# Patient Record
Sex: FEMALE | Race: WHITE | NOT HISPANIC OR LATINO | Employment: OTHER | URBAN - METROPOLITAN AREA
[De-identification: names, ages, dates, MRNs, and addresses within clinical notes are randomized per-mention and may not be internally consistent; named-entity substitution may affect disease eponyms.]

---

## 2017-05-11 ENCOUNTER — OFFICE VISIT (OUTPATIENT)
Dept: FAMILY MEDICINE CLINIC | Facility: CLINIC | Age: 72
End: 2017-05-11

## 2017-05-11 VITALS
HEART RATE: 85 BPM | DIASTOLIC BLOOD PRESSURE: 80 MMHG | RESPIRATION RATE: 16 BRPM | WEIGHT: 110 LBS | SYSTOLIC BLOOD PRESSURE: 132 MMHG | BODY MASS INDEX: 21.6 KG/M2 | HEIGHT: 60 IN | TEMPERATURE: 98.8 F

## 2017-05-11 DIAGNOSIS — J43.8 OTHER EMPHYSEMA (HCC): Primary | ICD-10-CM

## 2017-05-11 PROCEDURE — 99213 OFFICE O/P EST LOW 20 MIN: CPT | Performed by: FAMILY MEDICINE

## 2017-07-05 ENCOUNTER — OFFICE VISIT (OUTPATIENT)
Dept: FAMILY MEDICINE CLINIC | Facility: CLINIC | Age: 72
End: 2017-07-05

## 2017-07-05 VITALS
DIASTOLIC BLOOD PRESSURE: 80 MMHG | SYSTOLIC BLOOD PRESSURE: 161 MMHG | HEIGHT: 60 IN | WEIGHT: 109 LBS | HEART RATE: 92 BPM | OXYGEN SATURATION: 88 % | RESPIRATION RATE: 16 BRPM | TEMPERATURE: 98.3 F | BODY MASS INDEX: 21.4 KG/M2

## 2017-07-05 DIAGNOSIS — J43.8 OTHER EMPHYSEMA (HCC): Primary | ICD-10-CM

## 2017-07-05 DIAGNOSIS — R09.02 HYPOXIA: ICD-10-CM

## 2017-07-05 PROCEDURE — 99214 OFFICE O/P EST MOD 30 MIN: CPT | Performed by: FAMILY MEDICINE

## 2017-07-05 NOTE — PROGRESS NOTES
"Chief Complaint   Patient presents with   • Consult       Subjective   This patient presents the office to reevaluate her COPD.  [place]  She is concerned concerned about the decreased oxygen content at high altitudes.    Her next appointment with pulmonary medicine is the end of August.  I have reviewed and updated her medications, medical history and problem list during today's office visit.        Social History   Substance Use Topics   • Smoking status: Former Smoker     Packs/day: 1.00   • Smokeless tobacco: Never Used   • Alcohol use No       Review of Systems   Respiratory: Positive for cough (occaisional) and shortness of breath.    Cardiovascular: Negative for chest pain.       Objective   /80  Pulse 92  Temp 98.3 °F (36.8 °C) (Oral)   Resp 16  Ht 59.5\" (151.1 cm)  Wt 109 lb (49.4 kg)  SpO2 (!) 88% Comment: after walking down the hallway, room air  BMI 21.65 kg/m2  Body mass index is 21.65 kg/(m^2).  Physical Exam   Constitutional: She is cooperative. No distress.   Eyes: Conjunctivae and lids are normal.   Neck: Carotid bruit is not present. No tracheal deviation present.   Cardiovascular: Normal rate, regular rhythm and normal heart sounds.    No murmur heard.  Pulmonary/Chest: Effort normal and breath sounds normal.   Neurological: She is alert. She is not disoriented.   Skin: Skin is warm and dry.   Psychiatric: She has a normal mood and affect. Her speech is normal and behavior is normal.   Vitals reviewed.      Data Reviewed:    Recent pft shows copd.    Assessment/Plan     Problem List Items Addressed This Visit        Respiratory    COPD (chronic obstructive pulmonary disease) - Primary    Hypoxia  Portable O2 concentrator ordered through [employer]. 2 L per nasal cannula. Overnight study ordered to see if she needs nocturnal use. She will use it on her upcoming airplane trip. Copy of the order given to the patient so she can show it to the " airline. Letter also given to patient.           Outpatient Encounter Prescriptions as of 7/5/2017   Medication Sig Dispense Refill   • aspirin 81 MG tablet Take 81 mg by mouth daily.     • calcium carbonate (OS-BRANDI) 600 MG tablet Take 600 mg by mouth daily.     • folic acid (FOLVITE) 1 MG tablet Take 1 mg by mouth daily.     • lisinopril (PRINIVIL,ZESTRIL) 5 MG tablet Take 5 mg by mouth daily.     • Magnesium 250 MG tablet Take by mouth.     • Omega-3 Fatty Acids (FISH OIL) 1200 MG capsule capsule Take 1,200 mg by mouth daily.     • Tiotropium Bromide-Olodaterol 2.5-2.5 MCG/ACT aerosol solution Inhale 2 puffs Every Night for 180 days. 1 inhaler 5     No facility-administered encounter medications on file as of 7/5/2017.        No orders of the defined types were placed in this encounter.      Continue with current treatment plan.         RTC as needed or sooner if symptoms worsen or change

## 2017-07-18 ENCOUNTER — TELEPHONE (OUTPATIENT)
Dept: FAMILY MEDICINE CLINIC | Facility: CLINIC | Age: 72
End: 2017-07-18

## 2017-08-30 ENCOUNTER — HOSPITAL ENCOUNTER (OUTPATIENT)
Dept: SLEEP MEDICINE | Facility: HOSPITAL | Age: 72
Discharge: HOME | End: 2017-08-30
Admitting: INTERNAL MEDICINE

## 2017-08-30 PROCEDURE — 99203 OFFICE O/P NEW LOW 30 MIN: CPT | Performed by: INTERNAL MEDICINE

## 2017-08-30 PROCEDURE — G0463 HOSPITAL OUTPT CLINIC VISIT: HCPCS

## 2017-10-06 ENCOUNTER — HOSPITAL ENCOUNTER (OUTPATIENT)
Dept: PET IMAGING | Facility: HOSPITAL | Age: 72
Discharge: HOME | End: 2017-10-06
Admitting: CLINICAL NURSE SPECIALIST

## 2017-10-06 ENCOUNTER — CLINICAL SUPPORT (OUTPATIENT)
Dept: OTHER | Facility: HOSPITAL | Age: 72
End: 2017-10-06

## 2017-10-06 VITALS
HEIGHT: 59 IN | SYSTOLIC BLOOD PRESSURE: 116 MMHG | BODY MASS INDEX: 22.18 KG/M2 | TEMPERATURE: 97.6 F | WEIGHT: 110 LBS | HEART RATE: 72 BPM | RESPIRATION RATE: 18 BRPM | DIASTOLIC BLOOD PRESSURE: 70 MMHG

## 2017-10-06 DIAGNOSIS — Z87.891 HISTORY OF SMOKING 30 OR MORE PACK YEARS: ICD-10-CM

## 2017-10-06 DIAGNOSIS — Z12.2 ENCOUNTER FOR SCREENING FOR LUNG CANCER: Primary | ICD-10-CM

## 2017-10-06 DIAGNOSIS — Z12.2 ENCOUNTER FOR SCREENING FOR LUNG CANCER: ICD-10-CM

## 2017-10-06 PROCEDURE — G0463 HOSPITAL OUTPT CLINIC VISIT: HCPCS | Performed by: CLINICAL NURSE SPECIALIST

## 2017-10-06 PROCEDURE — G0296 VISIT TO DETERM LDCT ELIG: HCPCS | Performed by: CLINICAL NURSE SPECIALIST

## 2017-10-06 PROCEDURE — G0297 LDCT FOR LUNG CA SCREEN: HCPCS

## 2017-10-06 RX ORDER — ALBUTEROL SULFATE 90 UG/1
2 AEROSOL, METERED RESPIRATORY (INHALATION) EVERY 4 HOURS PRN
COMMUNITY

## 2017-10-06 NOTE — PROGRESS NOTES
[facility] Low-Dose Lung Cancer CT Screening Visit    Bethanie Waters is a pleasant 72 y.o.  female seen today at the request of Dr. Kb Huang in our Multidisciplinary Clinic, being seen for a Shared Decision Making Visit prior to Low-Dose Lung Cancer Screening.    SMOKING HISTORY:   History   Smoking Status   • Former Smoker   • Packs/day: 2.25   • Years: 45.00   • Types: Cigarettes   • Quit date: 2007   Smokeless Tobacco   • Never Used     Comment: Began smoking age 17.  Quit about 10 years ago.  Smoked about 2.25 ppd for 45 years for a 101.25 pack year history.       MEDICATIONS:  I have reviewed the medication list with the patient and updated it in the electronic medical record. Medication dosages and frequencies were confirmed to be accurate with the patient.    Bethanie Waters is here today for a low-dose lung cancer screening CT evaluation.  She is currently undergoing an evaluation by pulmonary and has been referred for screening.  She has completed a home sleep apnea evaluation and is awaiting those results as well.  She does have access to oxygen at home and uses it PRN almost daily at 2L/min.  She states that her oxygen saturation typically runs at about 95%.  She also uses an Albuterol inhaler usually daily for increasing shortness of breath as well.  Today her oxygen saturation on room air ranged from 90 to 92% on room air.  Her last chest CT was in September, 2015 and we reviewed those results together today.  She has a left axillary lipoma that continues to be palpable and showed up on her CT scan in 2015.  She also had some changes consistent with emphysema and a 3 cm on the upper pole of her left kidney.  She reports having had a number of surgeries to remove kidney cysts in the past.  Year ago she was told that on an X-Ray, it appeared that she had been exposed to TB.  She quit smoking about 10 years ago but smoked heavily prior to that for a 101.25 pack year history.  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  She also reports have a very serious respiratory inflammation in the past after traveling next to a coughing passenger for which she was hospitalized.  It took her about a year to recover.  She has no basement and one of her sons lives with her.  Nobody smokes in her house currently.  We discussed the connection between Radon exposure and lung cancer.  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  She is not aware of any significant exposures to chemicals or asbestos.  She has no personal history of cancer and has no lung cancer in her family.  She is confident that she will never smoke in the future.    She has no hemoptysis, unintentional weight loss or increasing shortness of breath. The patient has no signs or symptoms of lung cancer.     Together we discussed the risks and benefits of lung cancer screening. We reviewed the patient's smoking history and counseled on the importance of continued tobacco/smoking abstinence.     /70 (BP Location: Right arm, Patient Position: Sitting)  Pulse 72  Temp 97.6 °F (36.4 °C) (Oral)   Resp 18  Ht 58.75\" (149.2 cm)  Wt 110 lb (49.9 kg)  BMI 22.41 kg/m2    Pain Score    10/06/17 1220   PainSc: 0-No pain       Chest: Lung sounds are clear to auscultation, no wheezes, rales or rhonchi, with symmetric air entry.  Her oxygen saturation on room air ranged from 90 - 92% upon arrival to the clinic.    Smoking Cessation / Abstinence DISCUSSION HELD TODAY:     We discussed a number of resources to assist with smoking cessation including the 1-800-Quit Now line, Health Department programs, [place] [facility], and online and smart phone tools.  She states that she has no need as she will never smoke again.    Recommendations for continued lung cancer screening:      We discussed the NCCN guidelines for lung cancer screening and the patient " verbalized understanding that annual screening is recommended until fifteen years beyond smoking. The Lung Cancer Screening Shared Decision-Making Tool was utilized as an aid in discussing whether or not screening was right. The patient has decided to proceed with a Low Dose Lung Cancer Screening CT today. The patient requests that the results of his screening be shared with his PCP as well.       The patient verbalizes understanding that results of this low dose lung CT exam will be called and that assistance will be provided in arranging any necessary follow-up.    After review of the NCCN guidelines and recommendations for ongoing screening, the patient verbalized understanding of recommendations for follow-up. We discussed the importance of continued annual screening until 15 years beyond smoking.     15 minutes out of 30 minutes face-to-face spent counseling patient extensively on the continued health benefits of being tobacco free, the importance of continued tobacco abstinence, community resources available, and national guidelines for continued annual lung cancer screening until 15 years beyond a smoking history.     Angela Griffiths, MSN, APRN, ACNS-BC, AOCN, CHPN  Clinical Nurse Specialist  [facility]

## 2017-10-31 ENCOUNTER — TRANSCRIBE ORDERS (OUTPATIENT)
Dept: CARDIAC REHAB | Facility: HOSPITAL | Age: 72
End: 2017-10-31

## 2017-10-31 DIAGNOSIS — J44.9 COPD, SEVERE (HCC): Primary | ICD-10-CM

## 2017-11-08 ENCOUNTER — APPOINTMENT (OUTPATIENT)
Dept: CARDIAC REHAB | Facility: HOSPITAL | Age: 72
End: 2017-11-08

## 2021-02-19 ENCOUNTER — IMMUNIZATION (OUTPATIENT)
Dept: VACCINE CLINIC | Facility: HOSPITAL | Age: 76
End: 2021-02-19

## 2021-02-19 PROCEDURE — 0001A: CPT | Performed by: INTERNAL MEDICINE

## 2021-02-19 PROCEDURE — 91300 HC SARSCOV02 VAC 30MCG/0.3ML IM: CPT | Performed by: INTERNAL MEDICINE

## 2021-03-12 ENCOUNTER — IMMUNIZATION (OUTPATIENT)
Dept: VACCINE CLINIC | Facility: HOSPITAL | Age: 76
End: 2021-03-12

## 2021-03-12 PROCEDURE — 91300 HC SARSCOV02 VAC 30MCG/0.3ML IM: CPT | Performed by: INTERNAL MEDICINE

## 2021-03-12 PROCEDURE — 0002A: CPT | Performed by: INTERNAL MEDICINE

## 2021-04-22 ENCOUNTER — TRANSCRIBE ORDERS (OUTPATIENT)
Dept: ADMINISTRATIVE | Facility: HOSPITAL | Age: 76
End: 2021-04-22

## 2021-04-22 DIAGNOSIS — Z12.2 ENCOUNTER FOR SCREENING FOR LUNG CANCER: Primary | ICD-10-CM

## 2021-05-19 ENCOUNTER — HOSPITAL ENCOUNTER (OUTPATIENT)
Dept: CT IMAGING | Facility: HOSPITAL | Age: 76
Discharge: HOME | End: 2021-05-19
Admitting: INTERNAL MEDICINE

## 2021-05-19 DIAGNOSIS — Z12.2 ENCOUNTER FOR SCREENING FOR LUNG CANCER: ICD-10-CM

## 2021-05-19 PROCEDURE — 71271 CT THORAX LUNG CANCER SCR C-: CPT

## 2021-08-05 ENCOUNTER — TRANSCRIBE ORDERS (OUTPATIENT)
Dept: ADMINISTRATIVE | Facility: HOSPITAL | Age: 76
End: 2021-08-05

## 2021-08-05 DIAGNOSIS — H53.47 HETERONYMOUS BILATERAL FIELD DEFECTS IN VISUAL FIELD: Primary | ICD-10-CM

## 2021-08-31 ENCOUNTER — APPOINTMENT (OUTPATIENT)
Dept: MRI IMAGING | Facility: HOSPITAL | Age: 76
End: 2021-08-31

## 2021-08-31 ENCOUNTER — HOSPITAL ENCOUNTER (OUTPATIENT)
Dept: MRI IMAGING | Facility: HOSPITAL | Age: 76
Discharge: HOME | End: 2021-08-31
Admitting: OPHTHALMOLOGY

## 2021-08-31 DIAGNOSIS — H53.47 HETERONYMOUS BILATERAL FIELD DEFECTS IN VISUAL FIELD: ICD-10-CM

## 2021-08-31 PROCEDURE — 82565 ASSAY OF CREATININE: CPT

## 2021-08-31 PROCEDURE — 70543 MRI ORBT/FAC/NCK W/O &W/DYE: CPT

## 2021-08-31 PROCEDURE — A9577 INJ MULTIHANCE: HCPCS | Performed by: OPHTHALMOLOGY

## 2021-08-31 PROCEDURE — 0 GADOBENATE DIMEGLUMINE 529 MG/ML SOLUTION: Performed by: OPHTHALMOLOGY

## 2021-08-31 PROCEDURE — 70553 MRI BRAIN STEM W/O & W/DYE: CPT

## 2021-08-31 RX ADMIN — GADOBENATE DIMEGLUMINE 10 ML: 529 INJECTION, SOLUTION INTRAVENOUS at 16:32

## 2021-09-02 LAB — CREAT BLDA-MCNC: 0.8 MG/DL (ref 0.6–1.3)

## 2021-11-03 ENCOUNTER — IMMUNIZATION (OUTPATIENT)
Dept: VACCINE CLINIC | Facility: HOSPITAL | Age: 76
End: 2021-11-03

## 2021-11-03 PROCEDURE — 91300 HC SARSCOV02 VAC 30MCG/0.3ML IM: CPT | Performed by: INTERNAL MEDICINE

## 2021-11-03 PROCEDURE — 0004A ADM SARSCOV2 30MCG/0.3ML BOOSTER: CPT | Performed by: INTERNAL MEDICINE

## 2022-08-11 ENCOUNTER — OFFICE VISIT (OUTPATIENT)
Dept: FAMILY MEDICINE CLINIC | Facility: CLINIC | Age: 77
End: 2022-08-11

## 2022-08-11 VITALS
HEIGHT: 60 IN | TEMPERATURE: 98.4 F | HEART RATE: 77 BPM | SYSTOLIC BLOOD PRESSURE: 138 MMHG | WEIGHT: 118 LBS | RESPIRATION RATE: 18 BRPM | OXYGEN SATURATION: 94 % | DIASTOLIC BLOOD PRESSURE: 66 MMHG | BODY MASS INDEX: 23.16 KG/M2

## 2022-08-11 DIAGNOSIS — I10 ESSENTIAL HYPERTENSION: ICD-10-CM

## 2022-08-11 DIAGNOSIS — M79.89 MASS OF SOFT TISSUE OF KNEE: Primary | ICD-10-CM

## 2022-08-11 DIAGNOSIS — M25.469 KNEE SWELLING: ICD-10-CM

## 2022-08-11 PROBLEM — I25.118 ATHEROSCLEROSIS OF NATIVE CORONARY ARTERY OF NATIVE HEART WITH STABLE ANGINA PECTORIS (HCC): Status: ACTIVE | Noted: 2018-11-12

## 2022-08-11 PROBLEM — Z78.9 STATIN INTOLERANCE: Status: ACTIVE | Noted: 2018-11-12

## 2022-08-11 PROCEDURE — 99204 OFFICE O/P NEW MOD 45 MIN: CPT | Performed by: FAMILY MEDICINE

## 2022-08-11 PROCEDURE — 73560 X-RAY EXAM OF KNEE 1 OR 2: CPT | Performed by: FAMILY MEDICINE

## 2022-08-11 RX ORDER — BUDESONIDE, GLYCOPYRROLATE, AND FORMOTEROL FUMARATE 160; 9; 4.8 UG/1; UG/1; UG/1
AEROSOL, METERED RESPIRATORY (INHALATION)
COMMUNITY
Start: 2022-08-02

## 2022-08-11 RX ORDER — LISINOPRIL 5 MG/1
5 TABLET ORAL DAILY
Qty: 90 TABLET | Refills: 2 | Status: SHIPPED | OUTPATIENT
Start: 2022-08-11 | End: 2023-05-08

## 2022-08-11 NOTE — PROGRESS NOTES
"Chief Complaint  Joint Swelling (Rt knee swelling x 2 months, no injury )    Subjective          Bethanie presents to [facility] to evaluate swelling on the right medial aspect of her knee over the past 3 months.  No injury.  Her swelling has stayed about the same.  It is painless and causes no side effects of any sort.  She is a little worried about what it might be.    She needs blood pressure medicine refill.  Blood pressure is well controlled          Objective   Vital Signs:   Vitals:    08/11/22 1615   BP: 138/66   Pulse: 77   Resp: 18   Temp: 98.4 °F (36.9 °C)   SpO2: 94%   Weight: 53.5 kg (118 lb)   Height: 152.4 cm (60\")        BMI is within normal parameters. No other follow-up for BMI required.        Physical Exam  Constitutional:       General: She is not in acute distress.  Musculoskeletal:      Comments: See photo below  Soft tissue consistency similar to fatty tumor.  Nontender to palpation.  Measurements seen below clinical photograph below.   Neurological:      Mental Status: She is alert.        3.5 inches craniocaudad measurement by 3 inch width      Result Review :     The following data was reviewed by: Pardeep Davidson MD on 08/11/2022:  Right knee x-ray negative.         Assessment and Plan    Diagnoses and all orders for this visit:    1. Mass of soft tissue of knee (Primary)  Assessment & Plan:  New problem.  Check soft tissue MRI of right knee.  Referral to orthopedist for further evaluation.    Orders:  -     MRI Knee Right Without Contrast; Future  -     Ambulatory Referral to Orthopedic Surgery    2. Knee swelling  -     XR Knee 1 or 2 View Right    3. Essential hypertension  -     lisinopril (PRINIVIL,ZESTRIL) 5 MG tablet; Take 1 tablet by mouth Daily for 270 days.  Dispense: 90 tablet; Refill: 2      Follow Up   Return in about 6 months (around 2/11/2023) for recheck/refill medication.  Patient was given instructions and counseling regarding her condition or " for health maintenance advice. Please see specific information pulled into the AVS if appropriate.

## 2022-08-11 NOTE — ASSESSMENT & PLAN NOTE
New problem.  Check soft tissue MRI of right knee.  Referral to orthopedist for further evaluation.

## 2022-09-02 ENCOUNTER — HOSPITAL ENCOUNTER (OUTPATIENT)
Dept: MRI IMAGING | Facility: HOSPITAL | Age: 77
Discharge: HOME | End: 2022-09-02
Admitting: FAMILY MEDICINE

## 2022-09-02 DIAGNOSIS — M79.89 MASS OF SOFT TISSUE OF KNEE: ICD-10-CM

## 2022-09-02 PROCEDURE — 73721 MRI JNT OF LWR EXTRE W/O DYE: CPT

## 2022-09-06 ENCOUNTER — OFFICE VISIT (OUTPATIENT)
Dept: ORTHOPEDIC SURGERY | Facility: CLINIC | Age: 77
End: 2022-09-06

## 2022-09-06 VITALS — BODY MASS INDEX: 23.16 KG/M2 | TEMPERATURE: 98 F | HEIGHT: 60 IN | WEIGHT: 118 LBS

## 2022-09-06 DIAGNOSIS — M79.89 PAIN AND SWELLING OF RIGHT LOWER LEG: ICD-10-CM

## 2022-09-06 DIAGNOSIS — M79.661 PAIN AND SWELLING OF RIGHT LOWER LEG: ICD-10-CM

## 2022-09-06 DIAGNOSIS — R52 PAIN: Primary | ICD-10-CM

## 2022-09-06 PROCEDURE — 99203 OFFICE O/P NEW LOW 30 MIN: CPT | Performed by: ORTHOPAEDIC SURGERY

## 2022-09-06 PROCEDURE — 73562 X-RAY EXAM OF KNEE 3: CPT | Performed by: ORTHOPAEDIC SURGERY

## 2022-09-06 NOTE — PROGRESS NOTES
New Knee      Patient: Bethanie Waters        YOB: 1945    Medical Record Number: [id number]        Chief Complaints: Right knee swelling      History of Present Illness: This is a 76-year-old female who noticed a little bit of swelling on inferior to the knee its inferior medial states it does not hurt no history injury change in activity she just noticed it recently.  She does come with an MRI which shows some degenerative changes but nothing in that particular area.  Again she states she has no pain at all past medical history as listed below reviewed by me        Allergies:   Allergies   Allergen Reactions   • Codeine Nausea Only   • Simvastatin Myalgia   • Bactrim [Sulfamethoxazole-Trimethoprim] Rash   • Sulfa Antibiotics Rash       Medications:   Home Medications:  Current Outpatient Medications on File Prior to Visit   Medication Sig   • albuterol (PROVENTIL HFA;VENTOLIN HFA) 108 (90 Base) MCG/ACT inhaler Inhale 2 puffs Every 4 (Four) Hours As Needed for Wheezing.   • aspirin 81 MG tablet Take 81 mg by mouth daily.   • Breztri Aerosphere 160-9-4.8 MCG/ACT aerosol inhaler    • calcium carbonate (OS-BRANDI) 600 MG tablet Take 600 mg by mouth daily.   • folic acid (FOLVITE) 1 MG tablet Take 1 mg by mouth daily.   • lisinopril (PRINIVIL,ZESTRIL) 5 MG tablet Take 1 tablet by mouth Daily for 270 days.   • Magnesium 250 MG tablet Take 250 mg by mouth Daily. Takes sporadically   • O2 (OXYGEN) Inhale 2 L/min Daily As Needed (For increasing shortness of air or wheezing).   • Omega-3 Fatty Acids (FISH OIL) 1200 MG capsule capsule Take 1,200 mg by mouth daily.     No current facility-administered medications on file prior to visit.     Current Medications:  Scheduled Meds:  Continuous Infusions:No current facility-administered medications for this visit.    PRN Meds:.    Past Medical History:   Diagnosis Date   • Allergic rhinitis    • CAD (coronary artery disease)    • COPD (chronic obstructive pulmonary  "disease) (HCC)    • Hemochromatosis carrier    • History of echocardiogram 12/08/2010    normal LV wall motion and systolic function, no significant valvular abnormalities, RSVP 36   • Hyperlipidemia    • Kidney cysts    • Lipoma of axilla 09/11/2015        Past Surgical History:   Procedure Laterality Date   • AUGMENTATION MAMMAPLASTY      Bilateral Saline Implants   • CARDIAC SURGERY  1985    open heart   • COLONOSCOPY      Dr. Jane   • CORONARY ARTERY BYPASS GRAFT  1986   • EYE SURGERY  2017   • HYSTERECTOMY      partial   • KIDNEY SURGERY      Has had about 8 surgeries in the past to remove cysts from kidneys   • LASIK          Social History     Occupational History   • Not on file   Tobacco Use   • Smoking status: Former Smoker     Packs/day: 2.25     Years: 45.00     Pack years: 101.25     Types: Cigarettes     Quit date: 2007     Years since quitting: 15.6   • Smokeless tobacco: Never Used   • Tobacco comment: Began smoking age 17.  Quit about 10 years ago.  Smoked about 2.25 ppd for 45 years for a 101.25 pack year history.   Substance and Sexual Activity   • Alcohol use: No   • Drug use: No   • Sexual activity: Defer      Social History     Social History Narrative   • Not on file        Family History   Problem Relation Age of Onset   • Heart disease Mother    • Hypertension Mother    • Lung disease Mother    • COPD Father    • Heart disease Father    • Heart disease Sister    • Hypertension Sister    • Lung disease Sister    • Kidney cancer Maternal Grandfather    • COPD Sister    • Hemochromatosis Son    • Hemochromatosis Son    • No Known Problems Son    • No Known Problems Son              Review of Systems:     Review of Systems      Physical Exam: 76 y.o. female  General Appearance:    Alert, cooperative, in no acute distress                   Vitals:    09/06/22 1233   Temp: 98 °F (36.7 °C)   Weight: 53.5 kg (118 lb)   Height: 152.4 cm (60\")      Patient is alert and read ×3 no acute distress " appears her above-listed at height weight and age.  Affect is normal respiratory rate is normal unlabored. Heart rate regular rate rhythm, sclera, dentition and hearing are normal for the purpose of this exam.        Ortho Exam exam of the right knee she has no effusion no redness full range of motion no joint line tenderness negative bounce home negative Us she does have a little bit of swelling right that inferior medial aspect of the knee of the proximal medial tibia does not feel like a mass it just feels like some excess adipose tissue or fluid calf is soft and nontender    Procedures             Radiology:   AP, Lateral and merchant views of the right knee  were ordered/reviewed to evauateknee pain.  I have no comparative films these are essentially normal she has some mildDegenerative changes medial and patellofemoral no acute pathology she also comes with an MRI which shows some mild degenerative changes no mass at the point of interest  Imaging Results (Most Recent)     Procedure Component Value Units Date/Time    XR Knee 3 View Right [[id number]] Resulted: 09/06/22 1222     Updated: 09/06/22 1222    Impression:      Ordering physician's impression is located in the Encounter Note dated 09/06/22. X-ray performed in the DR room.          Assessment/Plan:    Right knee swelling just inferior medial to the knee I do not think there is any mass I told her that sometimes people will get some excess fluid in that area we talked about a brace and I think she wants to do 1 at this time do something for compression I told her she can get 1 at [employer] etc. she decides to do that.  She does have some degenerative changes so should those start bothering her where she does have knee pain we could do steroid injection she will give me a call

## 2023-07-31 DIAGNOSIS — I10 ESSENTIAL HYPERTENSION: ICD-10-CM

## 2023-07-31 RX ORDER — LISINOPRIL 5 MG/1
TABLET ORAL
Qty: 14 TABLET | Refills: 0 | Status: SHIPPED | OUTPATIENT
Start: 2023-07-31

## 2023-08-23 DIAGNOSIS — I10 ESSENTIAL HYPERTENSION: ICD-10-CM

## 2023-08-29 RX ORDER — LISINOPRIL 5 MG/1
TABLET ORAL
Qty: 7 TABLET | Refills: 0 | Status: SHIPPED | OUTPATIENT
Start: 2023-08-29

## 2023-08-29 NOTE — TELEPHONE ENCOUNTER
Rx Refill Note  Requested Prescriptions     Pending Prescriptions Disp Refills    lisinopril (PRINIVIL,ZESTRIL) 5 MG tablet [Pharmacy Med Name: LISINOPRIL 5MG TABLETS] 90 tablet      Sig: TAKE 1 TABLET BY MOUTH DAILY      Last office visit with prescribing clinician: 8/11/2022   Next office visit with prescribing clinician: Visit date not found

## 2023-09-24 DIAGNOSIS — I10 ESSENTIAL HYPERTENSION: ICD-10-CM

## 2023-09-25 RX ORDER — LISINOPRIL 5 MG/1
TABLET ORAL
Qty: 7 TABLET | Refills: 0 | OUTPATIENT
Start: 2023-09-25

## 2023-09-25 NOTE — TELEPHONE ENCOUNTER
Rx Refill Note  Requested Prescriptions     Pending Prescriptions Disp Refills    lisinopril (PRINIVIL,ZESTRIL) 5 MG tablet [Pharmacy Med Name: LISINOPRIL 5MG TABLETS] 7 tablet 0     Sig: TAKE 1 TABLET BY MOUTH DAILY      Last office visit with prescribing clinician: 8/11/2022   Next office visit with prescribing clinician: Visit date not found

## 2023-10-18 ENCOUNTER — OFFICE VISIT (OUTPATIENT)
Dept: FAMILY MEDICINE CLINIC | Facility: CLINIC | Age: 78
End: 2023-10-18
Payer: MEDICARE

## 2023-10-18 VITALS
BODY MASS INDEX: 22.74 KG/M2 | TEMPERATURE: 98.4 F | RESPIRATION RATE: 16 BRPM | HEIGHT: 60 IN | OXYGEN SATURATION: 95 % | DIASTOLIC BLOOD PRESSURE: 58 MMHG | WEIGHT: 115.8 LBS | SYSTOLIC BLOOD PRESSURE: 132 MMHG | HEART RATE: 80 BPM

## 2023-10-18 DIAGNOSIS — E78.5 DYSLIPIDEMIA: ICD-10-CM

## 2023-10-18 DIAGNOSIS — J43.8 OTHER EMPHYSEMA: ICD-10-CM

## 2023-10-18 DIAGNOSIS — Z13.6 SCREENING FOR ISCHEMIC HEART DISEASE: ICD-10-CM

## 2023-10-18 DIAGNOSIS — Z11.59 NEED FOR HEPATITIS C SCREENING TEST: ICD-10-CM

## 2023-10-18 DIAGNOSIS — R09.02 HYPOXIA: ICD-10-CM

## 2023-10-18 DIAGNOSIS — I10 ESSENTIAL HYPERTENSION: Primary | ICD-10-CM

## 2023-10-18 DIAGNOSIS — Z23 IMMUNIZATION DUE: ICD-10-CM

## 2023-10-18 PROCEDURE — G0008 ADMIN INFLUENZA VIRUS VAC: HCPCS | Performed by: FAMILY MEDICINE

## 2023-10-18 PROCEDURE — 90662 IIV NO PRSV INCREASED AG IM: CPT | Performed by: FAMILY MEDICINE

## 2023-10-18 PROCEDURE — 99214 OFFICE O/P EST MOD 30 MIN: CPT | Performed by: FAMILY MEDICINE

## 2023-10-18 PROCEDURE — G0009 ADMIN PNEUMOCOCCAL VACCINE: HCPCS | Performed by: FAMILY MEDICINE

## 2023-10-18 PROCEDURE — 90677 PCV20 VACCINE IM: CPT | Performed by: FAMILY MEDICINE

## 2023-10-18 PROCEDURE — 3078F DIAST BP <80 MM HG: CPT | Performed by: FAMILY MEDICINE

## 2023-10-18 PROCEDURE — 3075F SYST BP GE 130 - 139MM HG: CPT | Performed by: FAMILY MEDICINE

## 2023-10-18 RX ORDER — LISINOPRIL 5 MG/1
5 TABLET ORAL DAILY
Qty: 90 TABLET | Refills: 3 | Status: SHIPPED | OUTPATIENT
Start: 2023-10-18 | End: 2024-10-17

## 2023-10-18 NOTE — ASSESSMENT & PLAN NOTE
Will check lipid panel with upcoming labs.  Patient at high risk due to known coronary artery disease.

## 2023-10-18 NOTE — PROGRESS NOTES
"Chief Complaint  Med Refill and Hypertension (And for flu shot )    Subjective        Hypertension       Bethanie presents to [facility] for lab review and to refill current medications. Overall she feels well. No medication side effects are reported.            Objective   Vital Signs:   Vitals:    10/18/23 1612 10/18/23 1626   BP: 144/64 132/58   BP Location: Left arm Right arm   Patient Position: Sitting Sitting   Cuff Size:  Adult   Pulse: 80    Resp: 16    Temp: 98.4 °F (36.9 °C)    TempSrc: Oral    SpO2: 95%    Weight: 52.5 kg (115 lb 12.8 oz)    Height: 152.4 cm (60\")              BMI is within normal parameters. No other follow-up for BMI required.        Physical Exam  Vitals reviewed.   Constitutional:       General: She is not in acute distress.  Eyes:      General: Lids are normal.      Conjunctiva/sclera: Conjunctivae normal.   Neck:      Vascular: No carotid bruit.      Trachea: No tracheal deviation.   Cardiovascular:      Rate and Rhythm: Normal rate and regular rhythm.      Heart sounds: Normal heart sounds. No murmur heard.  Pulmonary:      Effort: Pulmonary effort is normal.      Breath sounds: Normal breath sounds.   Skin:     General: Skin is warm and dry.   Neurological:      Mental Status: She is alert. She is not disoriented.   Psychiatric:         Speech: Speech normal.         Behavior: Behavior normal. Behavior is cooperative.          Result Review :                Assessment and Plan    Diagnoses and all orders for this visit:    1. Essential hypertension (Primary)  Assessment & Plan:  Blood pressure well controlled.  Continue lisinopril.    Orders:  -     lisinopril (PRINIVIL,ZESTRIL) 5 MG tablet; Take 1 tablet by mouth Daily.  Dispense: 90 tablet; Refill: 3  -     Comprehensive Metabolic Panel; Future  -     CBC & Differential; Future  -     TSH; Future    2. Need for hepatitis C screening test  -     Hepatitis C Antibody; Future    3. Screening for " ischemic heart disease  -     CK; Future  -     Lipid Panel With / Chol / HDL Ratio; Future    4. Immunization due  -     Pneumococcal Conjugate Vaccine 20-Valent (PCV20)  -     Fluzone High-Dose 65+yrs    5. Other emphysema  Assessment & Plan:  Condition is stable. The current medical regimen is effective;  continue present plan and medications.  Continue to follow along with specialist regarding COPD diagnosis.      6. Hypoxia  Assessment & Plan:  Patient uses oxygen only during third shift on as-needed basis.      7. Dyslipidemia  Assessment & Plan:  Will check lipid panel with upcoming labs.  Patient at high risk due to known coronary artery disease.          Follow Up   Return in about 6 months (around 4/18/2024) for Medicare Wellness & regular visit, [id number] min.  Patient was given instructions and counseling regarding her condition or for health maintenance advice. Please see specific information pulled into the AVS if appropriate.

## 2023-10-18 NOTE — ASSESSMENT & PLAN NOTE
Condition is stable. The current medical regimen is effective;  continue present plan and medications.  Continue to follow along with specialist regarding COPD diagnosis.

## 2023-10-18 NOTE — PATIENT INSTRUCTIONS
Check on insurance coverage and cost for adacel Tdap(tetanus plus whooping cough vaccine) and get the immunization at your local pharmacy. (Once every 10 Years)     Check on insurance coverage and cost for Shingrix (newest shingles vaccine) and get the immunization at your local pharmacy. It is more effective than the old Zostavax vaccine and is recommended even if you have had the Zostavax vaccine in the past. For more information, please look at the website below:  https://www.cdc.gov/vaccines/vpd/shingles/public/shingrix/index.html     Your annual Covid booster is now due.     RSV vaccine is recommended.

## 2023-10-30 ENCOUNTER — OFFICE VISIT (OUTPATIENT)
Dept: FAMILY MEDICINE CLINIC | Facility: CLINIC | Age: 78
End: 2023-10-30
Payer: MEDICARE

## 2023-10-30 VITALS
SYSTOLIC BLOOD PRESSURE: 124 MMHG | OXYGEN SATURATION: 94 % | RESPIRATION RATE: 16 BRPM | BODY MASS INDEX: 22.97 KG/M2 | DIASTOLIC BLOOD PRESSURE: 68 MMHG | HEART RATE: 67 BPM | HEIGHT: 60 IN | TEMPERATURE: 97.1 F | WEIGHT: 117 LBS

## 2023-10-30 DIAGNOSIS — I25.810 CORONARY ARTERY DISEASE INVOLVING CORONARY BYPASS GRAFT OF NATIVE HEART WITHOUT ANGINA PECTORIS: ICD-10-CM

## 2023-10-30 DIAGNOSIS — Z78.9 STATIN INTOLERANCE: ICD-10-CM

## 2023-10-30 DIAGNOSIS — E78.5 DYSLIPIDEMIA: Primary | ICD-10-CM

## 2023-10-30 PROCEDURE — 3078F DIAST BP <80 MM HG: CPT | Performed by: FAMILY MEDICINE

## 2023-10-30 PROCEDURE — 3074F SYST BP LT 130 MM HG: CPT | Performed by: FAMILY MEDICINE

## 2023-10-30 PROCEDURE — 99214 OFFICE O/P EST MOD 30 MIN: CPT | Performed by: FAMILY MEDICINE

## 2023-10-30 RX ORDER — EVOLOCUMAB 140 MG/ML
140 INJECTION, SOLUTION SUBCUTANEOUS
Qty: 2 ML | Refills: 11 | Status: SHIPPED | OUTPATIENT
Start: 2023-10-30 | End: 2024-10-29

## 2023-10-30 NOTE — ASSESSMENT & PLAN NOTE
Cholesterol is not to goal.  Patient is multi statin intolerant.  She has known coronary artery disease.  Alternative therapy for this condition is needed.  Will start with Repatha. Samples given and demonstrated to patient. Follow up labs in 3 months

## 2023-10-30 NOTE — PROGRESS NOTES
"Chief Complaint  Follow-up and Hypertension (Test discuss )    Subjective        HPI   Bethanie presents to [facility] for     Lab review.  She has high cholesterol and known coronary artery disease and statin intolerance to simvastatin, rosuvastatin and a atorvastatin.  Her goal LDL cholesterol is less than 70.  Current labs are well above that number.  She has known CABG in the past and has had balloon angioplasty of left main artery in the past.  Thankfully no angina type pains currently.        Objective   Vital Signs:   Vitals:    10/30/23 1403   BP: 124/68   BP Location: Left arm   Patient Position: Sitting   Pulse: 67   Resp: 16   Temp: 97.1 °F (36.2 °C)   TempSrc: Oral   SpO2: 94%   Weight: 53.1 kg (117 lb)   Height: 152.4 cm (60\")            10/30/2023     2:06 PM   PHQ-2/PHQ-9 Depression Screening   Little Interest or Pleasure in Doing Things 0-->not at all   Feeling Down, Depressed or Hopeless 0-->not at all   PHQ-9: Brief Depression Severity Measure Score 0       BMI is within normal parameters. No other follow-up for BMI required.        Physical Exam  Constitutional:       General: She is not in acute distress.  Neurological:      Mental Status: She is alert.          Result Review :     The following data was reviewed by: Pardeep Davidson MD on 10/30/2023:  Hepatitis C Antibody (10/20/2023 11:35)  TSH (10/20/2023 11:35)  Lipid Panel With / Chol / HDL Ratio (10/20/2023 11:35) LDL cholesterol 164  CK (10/20/2023 11:35)  CBC & Differential (10/20/2023 11:35)  Comprehensive Metabolic Panel (10/20/2023 11:35)         Assessment and Plan    Diagnoses and all orders for this visit:    1. Dyslipidemia (Primary)  Assessment & Plan:  Cholesterol is not to goal.  Patient is multi statin intolerant.  She has known coronary artery disease.  Alternative therapy for this condition is needed.  Will start with Repatha. Samples given and demonstrated to patient. Follow up labs in 3 " months    Orders:  -     Repatha solution prefilled syringe injection; Inject 1 mL under the skin into the appropriate area as directed Every 14 (Fourteen) Days.  Dispense: 2 mL; Refill: 11  -     Comprehensive Metabolic Panel; Future  -     CBC & Differential; Future  -     CK; Future  -     Lipid Panel With / Chol / HDL Ratio; Future    2. Coronary artery disease involving coronary bypass graft of native heart without angina pectoris  -     Repatha solution prefilled syringe injection; Inject 1 mL under the skin into the appropriate area as directed Every 14 (Fourteen) Days.  Dispense: 2 mL; Refill: 11  -     Lipid Panel With / Chol / HDL Ratio; Future    3. Statin intolerance  -     Repatha solution prefilled syringe injection; Inject 1 mL under the skin into the appropriate area as directed Every 14 (Fourteen) Days.  Dispense: 2 mL; Refill: 11        Follow Up   Return in about 3 months (around 1/30/2024) for recheck/refill medication.  Patient was given instructions and counseling regarding her condition or for health maintenance advice. Please see specific information pulled into the AVS if appropriate.

## 2023-11-01 ENCOUNTER — TELEPHONE (OUTPATIENT)
Dept: FAMILY MEDICINE CLINIC | Facility: CLINIC | Age: 78
End: 2023-11-01
Payer: MEDICARE

## 2023-11-01 NOTE — TELEPHONE ENCOUNTER
"\"[facility]\" [facility] [place] needs a call concerning     Repatha solution prefilled syringe injection (10/30/2023)   "

## 2023-11-07 NOTE — TELEPHONE ENCOUNTER
Caller: AUGUSTUS HUFFMAN/ KAREN    Best call back number: [phone]*    What was the call regarding: AUGUSTUS CALLING WITH LEIGH, REGARDING PRIOR AUTHORIZATION FOR Repatha solution prefilled syringe injection . AUGUSTUS STATES THAT HE FAXED A PRIOR AUTHORIZATION FORM TO THE PRACTICE YESTERDAY, 11/6/23, TO START A PA. AUGUSTUS REQUEST A CALL BACK TO CONFIRM RECEIPT.

## 2023-11-13 ENCOUNTER — TELEPHONE (OUTPATIENT)
Dept: FAMILY MEDICINE CLINIC | Facility: CLINIC | Age: 78
End: 2023-11-13

## 2023-11-13 NOTE — TELEPHONE ENCOUNTER
Caller: [id number] [place], [place] - [address] AT [facility] [facility] [phone] [facility] [phone]     Relationship to patient: Pharmacy [phone]         Patient is needing: FAXED OVER REQUEST FOR PRIOR AUTHORIZATION FOR REPATHA, CALLING TO MAKE SURE RECEIVED AND IF THERE ARE ANY QUESTIONS.

## 2023-11-17 ENCOUNTER — TELEPHONE (OUTPATIENT)
Dept: FAMILY MEDICINE CLINIC | Facility: CLINIC | Age: 78
End: 2023-11-17

## 2023-11-20 ENCOUNTER — TELEPHONE (OUTPATIENT)
Dept: FAMILY MEDICINE CLINIC | Facility: CLINIC | Age: 78
End: 2023-11-20
Payer: MEDICARE

## 2023-11-20 NOTE — TELEPHONE ENCOUNTER
[facility] needs a prior authorization initiated.  Repatha solution prefilled syringe injection (10/30/2023)   Use #[id number]

## 2023-11-21 ENCOUNTER — PRIOR AUTHORIZATION (OUTPATIENT)
Dept: FAMILY MEDICINE CLINIC | Facility: CLINIC | Age: 78
End: 2023-11-21
Payer: MEDICARE

## 2024-02-02 ENCOUNTER — TELEPHONE (OUTPATIENT)
Dept: FAMILY MEDICINE CLINIC | Facility: CLINIC | Age: 79
End: 2024-02-02
Payer: MEDICARE

## 2024-02-06 ENCOUNTER — TELEPHONE (OUTPATIENT)
Dept: FAMILY MEDICINE CLINIC | Facility: CLINIC | Age: 79
End: 2024-02-06
Payer: MEDICARE

## 2024-10-17 ENCOUNTER — OFFICE VISIT (OUTPATIENT)
Dept: FAMILY MEDICINE CLINIC | Facility: CLINIC | Age: 79
End: 2024-10-17
Payer: MEDICARE

## 2024-10-17 VITALS
DIASTOLIC BLOOD PRESSURE: 68 MMHG | HEART RATE: 80 BPM | OXYGEN SATURATION: 99 % | HEIGHT: 60 IN | SYSTOLIC BLOOD PRESSURE: 132 MMHG | WEIGHT: 113 LBS | BODY MASS INDEX: 22.19 KG/M2

## 2024-10-17 DIAGNOSIS — Z20.818 EXPOSURE TO PERTUSSIS: Primary | ICD-10-CM

## 2024-10-17 PROCEDURE — 1126F AMNT PAIN NOTED NONE PRSNT: CPT | Performed by: FAMILY MEDICINE

## 2024-10-17 PROCEDURE — 3078F DIAST BP <80 MM HG: CPT | Performed by: FAMILY MEDICINE

## 2024-10-17 PROCEDURE — G2211 COMPLEX E/M VISIT ADD ON: HCPCS | Performed by: FAMILY MEDICINE

## 2024-10-17 PROCEDURE — 99213 OFFICE O/P EST LOW 20 MIN: CPT | Performed by: FAMILY MEDICINE

## 2024-10-17 PROCEDURE — 3075F SYST BP GE 130 - 139MM HG: CPT | Performed by: FAMILY MEDICINE

## 2024-10-17 RX ORDER — AZITHROMYCIN 250 MG/1
TABLET, FILM COATED ORAL
Qty: 6 TABLET | Refills: 0 | Status: SHIPPED | OUTPATIENT
Start: 2024-10-17

## 2024-10-17 NOTE — PROGRESS NOTES
"Chief Complaint  Exposed to pertussis    Subjective        HPI      The patient presents for evaluation of cough.    She reports that her boyfriend has been unwell with a severe cough for the past 3 days, which she describes as sounding like whooping cough. His condition worsened the following day, leading to a hospital visit where he was diagnosed with pertussis. She accompanied him to the emergency room and spent approximately 10 hours there. She is currently asymptomatic.    She also mentions that she has not received her COVID-19 or influenza vaccines this year, as she typically waits until mid-October to get them.    She has COPD and is in the third stage.    IMMUNIZATIONS  She has had shingles and pneumonia vaccines.           Vital Signs:   Vitals:    10/17/24 1510   BP: 132/68   Pulse: 80   SpO2: 99%   Weight: 51.3 kg (113 lb)   Height: 152.4 cm (60\")            10/17/2024     3:15 PM   PHQ-2/PHQ-9 Depression Screening   Little interest or pleasure in doing things Not at all   Feeling down, depressed, or hopeless Not at all       BMI is within normal parameters. No other follow-up for BMI required.        Physical Exam  Vitals reviewed.   Constitutional:       General: She is not in acute distress.  Cardiovascular:      Rate and Rhythm: Normal rate and regular rhythm.      Heart sounds: Normal heart sounds.   Pulmonary:      Effort: Pulmonary effort is normal.      Breath sounds: Normal breath sounds.   Neurological:      General: No focal deficit present.      Mental Status: She is alert.   Psychiatric:         Attention and Perception: Attention normal.         Mood and Affect: Mood normal.         Behavior: Behavior normal.                       Results                  Assessment and Plan     Assessment & Plan  Exposure to pertussis  Advised patient to go to the pharmacy today for Adacel Tdap vaccination.  We will treat her with 5 days of azithromycin.  She will take 2 tablets today on an empty stomach " with 8 ounces of water followed by 1 tablet daily with 8 ounces of water or nifty stomach for an additional 4 days.  Follow-up on an as-needed basis.  She knows to contact us if she gets any symptoms of cough or illness over the next couple of weeks.  Orders:    azithromycin (ZITHROMAX) 250 MG tablet; Take 2 tablets po the first day, then 1 tablet po daily for 4 days.        Return in about 2 months (around 12/17/2024), or if symptoms worsen or fail to improve, for Medicare Wellness & regular visit, [id number] min.     Patient was given instructions and counseling regarding her condition or for health maintenance advice. Please see specific information pulled into the AVS if appropriate.     Patient or patient representative verbalized consent for the use of Ambient Listening during the visit with  Pardeep Davidson MD for chart documentation. 10/17/2024  15:31 EDT

## 2024-11-06 DIAGNOSIS — I10 ESSENTIAL HYPERTENSION: ICD-10-CM

## 2024-11-06 RX ORDER — LISINOPRIL 5 MG/1
5 TABLET ORAL DAILY
Qty: 90 TABLET | Refills: 0 | Status: SHIPPED | OUTPATIENT
Start: 2024-11-06 | End: 2025-11-06

## 2025-02-01 DIAGNOSIS — I10 ESSENTIAL HYPERTENSION: ICD-10-CM

## 2025-02-03 DIAGNOSIS — I10 ESSENTIAL HYPERTENSION: ICD-10-CM

## 2025-02-03 RX ORDER — LISINOPRIL 5 MG/1
5 TABLET ORAL DAILY
Qty: 30 TABLET | Refills: 0 | Status: SHIPPED | OUTPATIENT
Start: 2025-02-03 | End: 2026-02-03

## 2025-02-03 RX ORDER — LISINOPRIL 5 MG/1
5 TABLET ORAL DAILY
Qty: 90 TABLET | OUTPATIENT
Start: 2025-02-03 | End: 2026-02-03

## 2025-04-25 ENCOUNTER — TELEPHONE (OUTPATIENT)
Dept: FAMILY MEDICINE CLINIC | Facility: CLINIC | Age: 80
End: 2025-04-25
Payer: MEDICARE

## 2025-04-25 NOTE — TELEPHONE ENCOUNTER
Received all from patient requesting a same day for cough and congestion.  I told patient that we did not have a same day with having only 3 providers.  I did suggest the urgent care and she stated that she would probably go.

## 2025-06-03 ENCOUNTER — OFFICE VISIT (OUTPATIENT)
Dept: FAMILY MEDICINE CLINIC | Facility: CLINIC | Age: 80
End: 2025-06-03
Payer: MEDICARE

## 2025-06-03 VITALS
DIASTOLIC BLOOD PRESSURE: 74 MMHG | HEART RATE: 74 BPM | WEIGHT: 106 LBS | BODY MASS INDEX: 20.81 KG/M2 | HEIGHT: 60 IN | SYSTOLIC BLOOD PRESSURE: 138 MMHG | OXYGEN SATURATION: 96 %

## 2025-06-03 DIAGNOSIS — L03.012 CELLULITIS OF FINGER OF LEFT HAND: ICD-10-CM

## 2025-06-03 DIAGNOSIS — I10 ESSENTIAL HYPERTENSION: Primary | ICD-10-CM

## 2025-06-03 DIAGNOSIS — E78.5 DYSLIPIDEMIA: ICD-10-CM

## 2025-06-03 DIAGNOSIS — Z78.9 STATIN INTOLERANCE: ICD-10-CM

## 2025-06-03 PROCEDURE — 1126F AMNT PAIN NOTED NONE PRSNT: CPT | Performed by: NURSE PRACTITIONER

## 2025-06-03 PROCEDURE — 3078F DIAST BP <80 MM HG: CPT | Performed by: NURSE PRACTITIONER

## 2025-06-03 PROCEDURE — 3075F SYST BP GE 130 - 139MM HG: CPT | Performed by: NURSE PRACTITIONER

## 2025-06-03 PROCEDURE — G2211 COMPLEX E/M VISIT ADD ON: HCPCS | Performed by: NURSE PRACTITIONER

## 2025-06-03 PROCEDURE — 99214 OFFICE O/P EST MOD 30 MIN: CPT | Performed by: NURSE PRACTITIONER

## 2025-06-03 RX ORDER — LISINOPRIL 5 MG/1
5 TABLET ORAL DAILY
Qty: 90 TABLET | Refills: 3 | Status: SHIPPED | OUTPATIENT
Start: 2025-06-03 | End: 2026-06-03

## 2025-06-03 RX ORDER — CEPHALEXIN 500 MG/1
500 CAPSULE ORAL 3 TIMES DAILY
Qty: 21 CAPSULE | Refills: 0 | Status: SHIPPED | OUTPATIENT
Start: 2025-06-03 | End: 2025-06-10

## 2025-06-03 RX ORDER — ARFORMOTEROL TARTRATE 15 UG/2ML
15 SOLUTION RESPIRATORY (INHALATION)
COMMUNITY

## 2025-06-03 RX ORDER — ALBUTEROL SULFATE 5 MG/ML
2.5 SOLUTION RESPIRATORY (INHALATION) EVERY 6 HOURS PRN
COMMUNITY

## 2025-06-03 NOTE — ASSESSMENT & PLAN NOTE
Recommend recheck lipid panel patient declined today  Continue to work on lower cholesterol diet and exercise as tolerated

## 2025-06-03 NOTE — PROGRESS NOTES
"Chief Complaint  Med Refill, Hypertension, and left middle finger cellulitis    Subjective        Hypertension  Associated symptoms: no chest pain, no headaches, no palpitations, no shortness of breath and no dizziness       Bethanie presents to [facility] as a 79-year-old female for follow-up on chronic conditions, to refill medications, review/order labs and to discuss some swelling and redness to her right middle finger    Hypertension-has been well-controlled on lisinopril.  No medication side effects.  No increase or changes in headaches no blurred vision no dizziness no flushing no chest pain or pressure    Hyperlipidemia-statin intolerance.  She does decline labs today  States that she does continue to work on a lower cholesterol diet and is try to remain as active as possible    She does have some redness and swelling to her left middle finger around her nailbed  Has been trying to keep the area dry and clean  No fever or drainage    She declines labs today  She has no other acute complaints        The following portions of the patient's history were reviewed and updated as appropriate: allergies, current medications, past family history, past medical history, past social history, past surgical history, and problem list     Review of Systems   Constitutional:  Negative for chills, fatigue and fever.   Eyes:  Negative for visual disturbance.   Respiratory:  Negative for cough, shortness of breath, wheezing and stridor.    Cardiovascular:  Negative for chest pain, palpitations and leg swelling.   Skin:  Positive for wound.   Neurological:  Negative for dizziness.   Psychiatric/Behavioral:  Negative for self-injury, sleep disturbance and suicidal ideas. The patient is not nervous/anxious.         Objective   Vital Signs:   Vitals:    06/03/25 1108   BP: 138/74   Pulse: 74   SpO2: 96%   Weight: 48.1 kg (106 lb)   Height: 152.4 cm (60\")            6/3/2025    11:17 AM   PHQ-2/PHQ-9 " Depression Screening   Little interest or pleasure in doing things Not at all   Feeling down, depressed, or hopeless Not at all       BMI is within normal parameters. No other follow-up for BMI required.        Physical Exam  Vitals reviewed.   Constitutional:       General: She is not in acute distress.  Eyes:      Conjunctiva/sclera: Conjunctivae normal.   Neck:      Thyroid: No thyromegaly.      Vascular: No carotid bruit.   Cardiovascular:      Rate and Rhythm: Normal rate and regular rhythm.      Heart sounds: Normal heart sounds.   Pulmonary:      Effort: Pulmonary effort is normal.      Breath sounds: Normal breath sounds.   Skin:     Findings: Wound present.      Comments: Erythema and swelling to left middle finger-  small red bump at nail bed   Neurological:      Mental Status: She is alert.   Psychiatric:         Attention and Perception: Attention normal.         Mood and Affect: Mood normal.          Result Review :                Assessment and Plan       Essential hypertension  Hypertension is stable and controlled  Continue current treatment regimen.  Blood pressure will be reassessed in 6 months.    Orders:    lisinopril (PRINIVIL,ZESTRIL) 5 MG tablet; Take 1 tablet by mouth Daily.    Cellulitis of finger of left hand  Take medication as directed  Follow-up with any increased redness swelling drainage fever       Dyslipidemia  Recommend recheck lipid panel patient declined today  Continue to work on lower cholesterol diet and exercise as tolerated         Statin intolerance  Intolerance to statin.  Declines labs today             Follow Up   Return in about 6 months (around 12/3/2025), or if symptoms worsen or fail to improve, for Medicare Wellness, Next scheduled follow up.  Patient was given instructions and counseling regarding her condition or for health maintenance advice. Please see specific information pulled into the AVS if appropriate.

## 2025-07-21 ENCOUNTER — OFFICE VISIT (OUTPATIENT)
Dept: FAMILY MEDICINE CLINIC | Facility: CLINIC | Age: 80
End: 2025-07-21
Payer: MEDICARE

## 2025-07-21 VITALS
HEART RATE: 88 BPM | BODY MASS INDEX: 21.2 KG/M2 | WEIGHT: 108 LBS | TEMPERATURE: 98.4 F | SYSTOLIC BLOOD PRESSURE: 136 MMHG | DIASTOLIC BLOOD PRESSURE: 60 MMHG | OXYGEN SATURATION: 96 % | HEIGHT: 60 IN

## 2025-07-21 DIAGNOSIS — J02.9 SORE THROAT: ICD-10-CM

## 2025-07-21 DIAGNOSIS — J40 BRONCHITIS: Primary | ICD-10-CM

## 2025-07-21 DIAGNOSIS — R50.9 FEVER, UNSPECIFIED FEVER CAUSE: ICD-10-CM

## 2025-07-21 LAB
EXPIRATION DATE: NORMAL
EXPIRATION DATE: NORMAL
FLUAV AG UPPER RESP QL IA.RAPID: NOT DETECTED
FLUBV AG UPPER RESP QL IA.RAPID: NOT DETECTED
INTERNAL CONTROL: NORMAL
INTERNAL CONTROL: NORMAL
Lab: NORMAL
Lab: NORMAL
S PYO AG THROAT QL: NEGATIVE
SARS-COV-2 AG UPPER RESP QL IA.RAPID: NOT DETECTED

## 2025-07-21 PROCEDURE — 3078F DIAST BP <80 MM HG: CPT | Performed by: FAMILY MEDICINE

## 2025-07-21 PROCEDURE — 87880 STREP A ASSAY W/OPTIC: CPT | Performed by: FAMILY MEDICINE

## 2025-07-21 PROCEDURE — 3075F SYST BP GE 130 - 139MM HG: CPT | Performed by: FAMILY MEDICINE

## 2025-07-21 PROCEDURE — 1126F AMNT PAIN NOTED NONE PRSNT: CPT | Performed by: FAMILY MEDICINE

## 2025-07-21 PROCEDURE — 87428 SARSCOV & INF VIR A&B AG IA: CPT | Performed by: FAMILY MEDICINE

## 2025-07-21 PROCEDURE — 99213 OFFICE O/P EST LOW 20 MIN: CPT | Performed by: FAMILY MEDICINE

## 2025-07-21 RX ORDER — DOXYCYCLINE HYCLATE 100 MG
100 TABLET ORAL 2 TIMES DAILY
Qty: 20 TABLET | Refills: 0 | Status: SHIPPED | OUTPATIENT
Start: 2025-07-21 | End: 2025-07-31

## 2025-07-21 NOTE — PROGRESS NOTES
"Chief Complaint  Fever (Started feeling bad yesterday), Sore Throat, and Cough    Subjective        HPI      The patient is a 79-year-old female presenting with a sore throat. She experienced severe coughing last night accompanied by dyspnea due to COPD and a fever of 100°F, which has now resolved. She reports producing dark mucus and suspects she may have strep throat. She describes a raw sensation when swallowing and attributes her symptoms to the weather and her medication regimen. Since the onset of her symptoms, she has noticed increased dyspnea and an abnormal feeling in her chest. She does not have any sinus tenderness or tooth pain.         MEDICATIONS  Current: albuterol, Otevra, Ormodrol  Past: Breztri           Vital Signs:   Vitals:    07/21/25 1329   BP: 136/60   Pulse: 88   Temp: 98.4 °F (36.9 °C)   SpO2: 96%   Weight: 49 kg (108 lb)   Height: 152.4 cm (60\")            6/3/2025    11:17 AM   PHQ-2/PHQ-9 Depression Screening   Little interest or pleasure in doing things Not at all   Feeling down, depressed, or hopeless Not at all       BMI is within normal parameters. No other follow-up for BMI required.        Physical Exam     General Appearance: Normal appearance, No acute distress.  HEENT: drainage down back of throat.  Respiratory: Lungs auscultated.clear, O2 in place  Psychiatric: normal affect, pleasant, cooperative with exam.  Other observations: no carotid bruits auscultated bilaterally.       The following data was reviewed by: Pardeep Davidson MD on 07/21/2025:  POCT SARS-CoV-2 Antigen DRISS + Flu (07/21/2025 13:43)  POCT rapid strep A (07/21/2025 13:44)    Results  - Strep test: negative  - COVID-19 test: negative  - Influenza test: negative                Assessment and Plan      1. Respiratory infection.  Initiate treatment due to COPD history. Prescribed doxycycline 100 mg BID for 10 days with food. Avoid OTC vitamins, minerals, and dairy products. Prescription sent to [facility].       "   Bronchitis  Treat expectantly with doxycycline twice daily x 10 days with food but avoidance of over-the-counter vitamins and minerals and also avoidance of dairy products while on this medication.  Orders:    POCT SARS-CoV-2 Antigen DRISS + Flu    doxycycline (VIBRAMYICN) 100 MG tablet; Take 1 tablet by mouth 2 (Two) Times a Day for 10 days. No dairy products within 2 hours of a dose    Sore throat    Orders:    POCT SARS-CoV-2 Antigen DRISS + Flu    POCT rapid strep A    Fever, unspecified fever cause    Orders:    POCT SARS-CoV-2 Antigen DRISS + Flu    POCT rapid strep A       Return if symptoms worsen or fail to improve.     Patient was given instructions and counseling regarding her condition or for health maintenance advice. Please see specific information pulled into the AVS if appropriate.     Patient or patient representative verbalized consent for the use of Ambient Listening during the visit with  Pardeep Davidson MD for chart documentation. 7/21/2025  14:00 EDT

## 2025-08-13 ENCOUNTER — TRANSCRIBE ORDERS (OUTPATIENT)
Dept: CARDIAC REHAB | Facility: HOSPITAL | Age: 80
End: 2025-08-13
Payer: MEDICARE

## 2025-08-13 DIAGNOSIS — J44.9 COPD, SEVERE: Primary | ICD-10-CM

## 2025-08-27 ENCOUNTER — OFFICE VISIT (OUTPATIENT)
Dept: CARDIAC REHAB | Facility: HOSPITAL | Age: 80
End: 2025-08-27
Payer: MEDICARE

## 2025-08-27 VITALS
SYSTOLIC BLOOD PRESSURE: 140 MMHG | BODY MASS INDEX: 20.97 KG/M2 | WEIGHT: 106.8 LBS | HEART RATE: 80 BPM | HEIGHT: 60 IN | OXYGEN SATURATION: 97 % | DIASTOLIC BLOOD PRESSURE: 70 MMHG

## 2025-08-27 DIAGNOSIS — J44.9 COPD, SEVERE: Primary | ICD-10-CM

## 2025-08-27 PROCEDURE — 94625 PHY/QHP OP PULM RHB W/O MNTR: CPT

## 2025-08-28 ENCOUNTER — TREATMENT (OUTPATIENT)
Dept: CARDIAC REHAB | Facility: HOSPITAL | Age: 80
End: 2025-08-28
Payer: MEDICARE

## 2025-08-28 DIAGNOSIS — J44.9 COPD, SEVERE: Primary | ICD-10-CM

## 2025-08-28 PROCEDURE — 94625 PHY/QHP OP PULM RHB W/O MNTR: CPT
